# Patient Record
Sex: MALE | Race: WHITE | URBAN - METROPOLITAN AREA
[De-identification: names, ages, dates, MRNs, and addresses within clinical notes are randomized per-mention and may not be internally consistent; named-entity substitution may affect disease eponyms.]

---

## 2019-07-15 ENCOUNTER — TELEPHONE (OUTPATIENT)
Dept: PRIMARY CARE CLINIC | Age: 34
End: 2019-07-15

## 2019-07-15 ENCOUNTER — OFFICE VISIT (OUTPATIENT)
Dept: FAMILY MEDICINE CLINIC | Age: 34
End: 2019-07-15

## 2019-07-15 VITALS
DIASTOLIC BLOOD PRESSURE: 84 MMHG | BODY MASS INDEX: 33 KG/M2 | WEIGHT: 271 LBS | RESPIRATION RATE: 16 BRPM | TEMPERATURE: 98.7 F | HEIGHT: 76 IN | HEART RATE: 88 BPM | SYSTOLIC BLOOD PRESSURE: 144 MMHG | OXYGEN SATURATION: 99 %

## 2019-07-15 DIAGNOSIS — M54.50 ACUTE RIGHT-SIDED LOW BACK PAIN WITHOUT SCIATICA: ICD-10-CM

## 2019-07-15 DIAGNOSIS — S23.9XXA THORACIC BACK SPRAIN, INITIAL ENCOUNTER: ICD-10-CM

## 2019-07-15 PROCEDURE — 96372 THER/PROPH/DIAG INJ SC/IM: CPT | Performed by: FAMILY MEDICINE

## 2019-07-15 PROCEDURE — 99213 OFFICE O/P EST LOW 20 MIN: CPT | Performed by: FAMILY MEDICINE

## 2019-07-15 RX ORDER — METHYLPREDNISOLONE ACETATE 40 MG/ML
40 INJECTION, SUSPENSION INTRA-ARTICULAR; INTRALESIONAL; INTRAMUSCULAR; SOFT TISSUE ONCE
Status: COMPLETED | OUTPATIENT
Start: 2019-07-15 | End: 2019-07-15

## 2019-07-15 RX ORDER — METHYLPREDNISOLONE 4 MG/1
4 TABLET ORAL SEE ADMIN INSTRUCTIONS
Qty: 15 TABLET | Refills: 0 | Status: SHIPPED | OUTPATIENT
Start: 2019-07-15 | End: 2019-07-21

## 2019-07-15 RX ORDER — CYCLOBENZAPRINE HCL 10 MG
10 TABLET ORAL 2 TIMES DAILY PRN
Qty: 20 TABLET | Refills: 0 | Status: SHIPPED | OUTPATIENT
Start: 2019-07-15 | End: 2019-07-25

## 2019-07-15 RX ADMIN — METHYLPREDNISOLONE ACETATE 40 MG: 40 INJECTION, SUSPENSION INTRA-ARTICULAR; INTRALESIONAL; INTRAMUSCULAR; SOFT TISSUE at 09:45

## 2019-07-15 NOTE — PROGRESS NOTES
negative   bilaterally. Mild decrease in muscle strength secondary to pain. Mild decreased range of motion of the lumbar spine secondary to pain. Pain on palpation of the right paraspinal musculature of the thoracic and lumbar spine. Neurological: He is alert. Reflex Scores:       Patellar reflexes are 2+ on the right side and 2+ on the left side. Achilles reflexes are 2+ on the right side and 2+ on the left side. Sensation to light touch intact throughout lower extremities. Skin: Skin is warm. Psychiatric: He has a normal mood and affect. Assessment and Plan:   Yousuf Martin was seen today for back pain. Diagnoses and all orders for this visit:    Acute right-sided low back pain without sciatica  -     cyclobenzaprine (FLEXERIL) 10 MG tablet; Take 1 tablet by mouth 2 times daily as needed for Muscle spasms  -     methylPREDNISolone (MEDROL) 4 MG tablet; Take 1 tablet by mouth See Admin Instructions for 6 days Day 1: 5 tablets  Day 2: 4 tablets  Day 3: 3 tablets  Day 4: 2 tablets  Day 5: 1 tablets  -     XR THORACOLUMBAR SPINE (MIN 2 VIEWS); Future  -     methylPREDNISolone acetate (DEPO-MEDROL) injection 40 mg    Thoracic back sprain, initial encounter  -     cyclobenzaprine (FLEXERIL) 10 MG tablet; Take 1 tablet by mouth 2 times daily as needed for Muscle spasms  -     methylPREDNISolone (MEDROL) 4 MG tablet; Take 1 tablet by mouth See Admin Instructions for 6 days Day 1: 5 tablets  Day 2: 4 tablets  Day 3: 3 tablets  Day 4: 2 tablets  Day 5: 1 tablets  -     XR THORACOLUMBAR SPINE (MIN 2 VIEWS); Future  -     methylPREDNISolone acetate (DEPO-MEDROL) injection 40 mg        Return in about 1 week (around 7/22/2019). The above treatment regimen was discussed with the patient at length and all questions in regards to such were answered. Common side effects of steroids and/or muscle relaxers were reviewed if prescribed.  If patient does take muscle relaxers they are aware that they should avoid

## 2023-03-13 ENCOUNTER — HOSPITAL ENCOUNTER (OUTPATIENT)
Dept: CT IMAGING | Age: 38
Discharge: HOME | End: 2023-03-15
Payer: COMMERCIAL

## 2023-03-13 DIAGNOSIS — M25.522 ELBOW PAIN, LEFT: ICD-10-CM

## 2023-03-13 DIAGNOSIS — S52.002A UNSPECIFIED FRACTURE OF UPPER END OF LEFT ULNA, INITIAL ENCOUNTER FOR CLOSED FRACTURE: ICD-10-CM

## 2023-03-13 PROCEDURE — 73200 CT UPPER EXTREMITY W/O DYE: CPT
